# Patient Record
Sex: FEMALE | Race: WHITE | Employment: OTHER | URBAN - METROPOLITAN AREA
[De-identification: names, ages, dates, MRNs, and addresses within clinical notes are randomized per-mention and may not be internally consistent; named-entity substitution may affect disease eponyms.]

---

## 2017-05-20 PROBLEM — R79.1 SUPRATHERAPEUTIC INR: Status: ACTIVE | Noted: 2017-05-20

## 2017-05-20 PROBLEM — S30.1XXA RECTUS SHEATH HEMATOMA: Status: ACTIVE | Noted: 2017-05-20

## 2017-05-20 PROBLEM — Z79.01 CHRONIC ANTICOAGULATION: Status: ACTIVE | Noted: 2017-05-20

## 2020-07-11 ENCOUNTER — HOSPITAL ENCOUNTER (EMERGENCY)
Age: 80
Discharge: HOME | End: 2020-07-11
Attending: EMERGENCY MEDICINE
Payer: MEDICARE

## 2020-07-11 LAB
A/G RATIO: 1.1 (ref 1.1–2.2)
ALBUMIN SERPL-MCNC: 4.1 G/DL (ref 3.4–5)
ALP BLD-CCNC: 117 U/L (ref 40–129)
ALT SERPL-CCNC: 8 U/L (ref 10–40)
ANION GAP SERPL CALCULATED.3IONS-SCNC: 13 MMOL/L (ref 3–16)
APTT: 29.8 SEC (ref 24.2–36.2)
AST SERPL-CCNC: 17 U/L (ref 15–37)
BASOPHILS ABSOLUTE: 0 K/UL (ref 0–0.2)
BASOPHILS RELATIVE PERCENT: 0.8 %
BILIRUB SERPL-MCNC: 0.4 MG/DL (ref 0–1)
BUN BLDV-MCNC: 28 MG/DL (ref 7–20)
CALCIUM SERPL-MCNC: 9.5 MG/DL (ref 8.3–10.6)
CHLORIDE BLD-SCNC: 100 MMOL/L (ref 99–110)
CO2: 25 MMOL/L (ref 21–32)
CREAT SERPL-MCNC: 1 MG/DL (ref 0.6–1.2)
EOSINOPHILS ABSOLUTE: 0 K/UL (ref 0–0.6)
EOSINOPHILS RELATIVE PERCENT: 0.1 %
GFR AFRICAN AMERICAN: >60
GFR NON-AFRICAN AMERICAN: 53
GLOBULIN: 3.7 G/DL
GLUCOSE BLD-MCNC: 141 MG/DL (ref 70–99)
HCT VFR BLD CALC: 27.9 % (ref 36–48)
HEMOGLOBIN: 8.9 G/DL (ref 12–16)
INR BLD: 1.19 (ref 0.86–1.14)
LYMPHOCYTES ABSOLUTE: 0.7 K/UL (ref 1–5.1)
LYMPHOCYTES RELATIVE PERCENT: 17.9 %
MCH RBC QN AUTO: 27.5 PG (ref 26–34)
MCHC RBC AUTO-ENTMCNC: 31.9 G/DL (ref 31–36)
MCV RBC AUTO: 86.1 FL (ref 80–100)
MONOCYTES ABSOLUTE: 0.7 K/UL (ref 0–1.3)
MONOCYTES RELATIVE PERCENT: 17.7 %
NEUTROPHILS ABSOLUTE: 2.4 K/UL (ref 1.7–7.7)
NEUTROPHILS RELATIVE PERCENT: 63.5 %
PDW BLD-RTO: 20.9 % (ref 12.4–15.4)
PLATELET # BLD: 140 K/UL (ref 135–450)
PMV BLD AUTO: 7.8 FL (ref 5–10.5)
POTASSIUM SERPL-SCNC: 3.5 MMOL/L (ref 3.5–5.1)
PROTHROMBIN TIME: 13.8 SEC (ref 10–13.2)
RBC # BLD: 3.24 M/UL (ref 4–5.2)
SODIUM BLD-SCNC: 138 MMOL/L (ref 136–145)
TOTAL PROTEIN: 7.8 G/DL (ref 6.4–8.2)
WBC # BLD: 3.7 K/UL (ref 4–11)

## 2020-07-11 PROCEDURE — 85025 COMPLETE CBC W/AUTO DIFF WBC: CPT

## 2020-07-11 PROCEDURE — 85730 THROMBOPLASTIN TIME PARTIAL: CPT

## 2020-07-11 PROCEDURE — 80053 COMPREHEN METABOLIC PANEL: CPT

## 2020-07-11 PROCEDURE — 99283 EMERGENCY DEPT VISIT LOW MDM: CPT

## 2020-07-11 PROCEDURE — 85610 PROTHROMBIN TIME: CPT

## 2020-07-11 PROCEDURE — 36415 COLL VENOUS BLD VENIPUNCTURE: CPT

## 2020-07-11 NOTE — ED PROVIDER NOTES
CHIEF COMPLAINT  Epistaxis      HISTORY OF PRESENT ILLNESS  Cherise Albert is a 79 y.o. female presents to the ED with nosebleed, started a few hours ago, called EMS, bleeding had stopped by then, but BP was 95/50 per squad, improved en route after a little fluid, they noted a lot of blood at the home, had similar issue last May, no longer on blood thinners for afib. No other bleeding issues recently, no bleeding disorders, no recent cough/URI symptoms, thinks the air was just dry at home, no pain/injury, no headache, no vision changes, no numbness/weakness, no speech changes, no dysphagia, feels a little nauseated, no vomiting, thinks some was going down her throat. No other complaints, modifying factors or associated symptoms. I have reviewed the following from the nursing documentation. Past Medical History:   Diagnosis Date    [facility] ([facility]     Arthritis     Bronchitis     Heart murmur     Hypertension     Pneumonia      Past Surgical History:   Procedure Laterality Date    APPENDECTOMY      HYSTERECTOMY       No family history on file.   Social History     Socioeconomic History    Marital status:      Spouse name: Not on file    Number of children: Not on file    Years of education: Not on file    Highest education level: Not on file   Occupational History    Not on file   Social Needs    Financial resource strain: Not on file    Food insecurity     Worry: Not on file     Inability: Not on file    Transportation needs     Medical: Not on file     Non-medical: Not on file   Tobacco Use    Smoking status: Never Smoker   Substance and Sexual Activity    Alcohol use: No    Drug use: No    Sexual activity: Not on file   Lifestyle    Physical activity     Days per week: Not on file     Minutes per session: Not on file    Stress: Not on file   Relationships    Social connections     Talks on phone: Not on file     Gets together: Not on file     Attends Hinduism service: Not on file Active member of club or organization: Not on file     Attends meetings of clubs or organizations: Not on file     Relationship status: Not on file    Intimate partner violence     Fear of current or ex partner: Not on file     Emotionally abused: Not on file     Physically abused: Not on file     Forced sexual activity: Not on file   Other Topics Concern    Not on file   Social History Narrative    Not on file     No current facility-administered medications for this encounter. Current Outpatient Medications   Medication Sig Dispense Refill    traMADol (ULTRAM) 50 MG tablet Take 50 mg by mouth every 6 hours as needed for Pain      lisinopril-hydrochlorothiazide (PRINZIDE;ZESTORETIC) 20-25 MG per tablet Take 2 tablets by mouth daily       verapamil (CALAN-SR) 240 MG CR tablet Take 240 mg by mouth nightly. Allergies   Allergen Reactions    Mucinex [Guaifenesin Er] Other (See Comments)     Causes nightmares    Nsaids Other (See Comments)     On blood thinners    Prevnar 13 [Pneumococcal 13-Any Conj Vacc] Dermatitis       REVIEW OF SYSTEMS  10 systems reviewed, pertinent positives per HPI otherwise noted to be negative. PHYSICAL EXAM  There were no vitals taken for this visit. GENERAL APPEARANCE: Awake and alert. Cooperative. No acute distress  HEAD: Normocephalic. Atraumatic. EYES: PERRL. EOM's grossly intact. ENT: Mucous membranes are moist. Dried blood in left nare, no septal hematoma, no erythema/edema, no ecchymosis, no active epistaxis, airway patent, no active posterior pharyngeal bleeding, no oropharyngeal erythema/edema/exudates  NECK: Supple. No rigidity  HEART: irreg irreg, rate in 80s. No murmurs  LUNGS: Respirations nonlabored. Lungs are CTAB. ABDOMEN: Soft. Non-distended. Non-tender. No guarding or rebound. EXTREMITIES: No peripheral edema. Moves all extremities equally. All extremities neurovascularly intact. SKIN: Warm and dry. No acute rashes.    NEUROLOGICAL: Alert and oriented. No gross facial drooping. Strength 5/5, sensation intact. No truncal ataxia. Normal speech  PSYCHIATRIC: Normal mood and affect. LABS  I have reviewed all labs for this visit. Results for orders placed or performed during the hospital encounter of 07/11/20   CBC Auto Differential   Result Value Ref Range    WBC 3.7 (L) 4.0 - 11.0 K/uL    RBC 3.24 (L) 4.00 - 5.20 M/uL    Hemoglobin 8.9 (L) 12.0 - 16.0 g/dL    Hematocrit 27.9 (L) 36.0 - 48.0 %    MCV 86.1 80.0 - 100.0 fL    MCH 27.5 26.0 - 34.0 pg    MCHC 31.9 31.0 - 36.0 g/dL    RDW 20.9 (H) 12.4 - 15.4 %    Platelets 260 152 - 046 K/uL    MPV 7.8 5.0 - 10.5 fL    Neutrophils % 63.5 %    Lymphocytes % 17.9 %    Monocytes % 17.7 %    Eosinophils % 0.1 %    Basophils % 0.8 %    Neutrophils Absolute 2.4 1.7 - 7.7 K/uL    Lymphocytes Absolute 0.7 (L) 1.0 - 5.1 K/uL    Monocytes Absolute 0.7 0.0 - 1.3 K/uL    Eosinophils Absolute 0.0 0.0 - 0.6 K/uL    Basophils Absolute 0.0 0.0 - 0.2 K/uL   Comprehensive Metabolic Panel   Result Value Ref Range    Sodium 138 136 - 145 mmol/L    Potassium 3.5 3.5 - 5.1 mmol/L    Chloride 100 99 - 110 mmol/L    CO2 25 21 - 32 mmol/L    Anion Gap 13 3 - 16    Glucose 141 (H) 70 - 99 mg/dL    BUN 28 (H) 7 - 20 mg/dL    CREATININE 1.0 0.6 - 1.2 mg/dL    GFR Non- 53 (A) >60    GFR African American >60 >60    Calcium 9.5 8.3 - 10.6 mg/dL    Total Protein 7.8 6.4 - 8.2 g/dL    Alb 4.1 3.4 - 5.0 g/dL    Albumin/Globulin Ratio 1.1 1.1 - 2.2    Total Bilirubin 0.4 0.0 - 1.0 mg/dL    Alkaline Phosphatase 117 40 - 129 U/L    ALT 8 (L) 10 - 40 U/L    AST 17 15 - 37 U/L    Globulin 3.7 g/dL   Protime-INR   Result Value Ref Range    Protime 13.8 (H) 10.0 - 13.2 sec    INR 1.19 (H) 0.86 - 1.14   APTT   Result Value Ref Range    aPTT 29.8 24.2 - 36.2 sec     Downtime labs were on paper  Hemoglobin 8.9      ED COURSE/MDM  Patient seen and evaluated. Old records reviewed.  Labs and imaging reviewed and results discussed with patient. Orders Placed This Encounter   Procedures    CBC Auto Differential    Comprehensive Metabolic Panel    Protime-INR    APTT     78yo F w/ epistaxis resolved PTA, hx of afib but is not anticoagulated, Hgb 8.9 which isn't far from her prior, encouraged to f/u w/ PCP about incidental other lab findings. coags w/o significant abnormality, This patient was seen during Epic downtime in the ED, refer to paper charting for missing vitals/meds, etc. Patient appeared to be hemodynamically stable and had no further bleeding after a monitoring period in the ED, encouraged to make sure she takes her BP meds, coolmist humidifier, avoid nose picking, etc.  Strict return precautions given, all questions answered, will return if any worsening symptoms or new concerns, see AVS for further discharge information, patient verbalized understanding of plan, felt comfortable going home. CLINICAL IMPRESSION  1. Epistaxis    2. Atrial fibrillation, unspecified type ([address]    3. Essential hypertension      Vitals on downtime papers    DISPOSITION  Azalia Gomez was discharged to home in stable condition.                    Khoi Juarez DO  08/10/20 8900

## 2020-08-17 NOTE — PROGRESS NOTES
[address]   Cardiac Consultation    Referring Provider:  Regina Diallo     Chief Complaint   Patient presents with    New Patient     Cardio follow up for Atrial Fibrillation, HTN, heart murmur      Subjective: Patient is being seen today for new cardiology evaluation for AFIB, HTN, heart murmur      History of Present Illness: Today she feels good overall. Rheumatic fever as a child age 5. She had subsequently [employer] dance. She reports she had surgery in 1987 during surgery discovered she was in AFIB. Blood thinner given was started at that time. 3 years ago she had a ruptured blood vessel in abdomen in between muscles and blood thinner was stopped. . She reports nose bleeds even with baby aspirin. 1st nose bleed was April 2020 and recently while in [facility] on vacation in May 2020 had another nose bleed and was advised to follow up with cardiologist.  Denies chest pain, shortness of breath,  dizziness, palpitations and syncope. She checks her BP, weight and oxygen level every day at home and its been fine. Her daughter Jacqueline Carmona is present at exam.  She was told years ago that she needed Mitral valve repair but had no interest in pursuing this. She continues to refuse having done. States \"I don't have a good feeling about this\"    Past Medical History:   has a past medical history of A-fib ([address] Arthritis, Bronchitis, Heart murmur, Hypertension, and Pneumonia. Surgical History:   has a past surgical history that includes Hysterectomy and Appendectomy. Social History:   reports that she has never smoked. She has never used smokeless tobacco. She reports that she does not drink alcohol or use drugs. Family History:  family history is not on file. Home Medications:  Prior to Admission medications    Medication Sig Start Date End Date Taking?  Authorizing Provider   furosemide (LASIX) 20 MG tablet Take 20 mg by mouth daily  8/5/20  Yes Historical Provider, MD   losartan (COZAAR) 100 MG tablet  2/12/20  Yes Historical Provider, MD   Cholecalciferol (VITAMIN D3) 50 MCG (2000 UT) CAPS take one tablet daily 6/10/20  Yes Historical Provider, MD   potassium chloride (KLOR-CON M) 20 MEQ TBCR extended release tablet Take by mouth 7/8/20  Yes Historical Provider, MD   losartan-hydroCHLOROthiazide (HYZAAR) 100-25 MG per tablet Take 1 tablet by mouth daily   Yes Historical Provider, MD   ferrous sulfate (IRON 325) 325 (65 Fe) MG tablet Take 325 mg by mouth daily (with breakfast)   Yes Historical Provider, MD   verapamil (CALAN-SR) 240 MG CR tablet Take 240 mg by mouth nightly. Yes Historical Provider, MD   traMADol (ULTRAM) 50 MG tablet Take 50 mg by mouth every 6 hours as needed for Pain    Historical Provider, MD        Allergies:  Mucinex [guaifenesin er]; Nsaids; and Prevnar 13 [pneumococcal 13-alisa conj vacc]     Review of Systems:   12 point ROS negative in all areas as listed below except as in [place]  Constitutional, EENT, Cardiovascular, pulmonary, GI, , Musculoskeletal, skin, neurological, hematological, endocrine, Psychiatric    Physical Examination:    Vitals:    08/18/20 1538   BP: (!) 144/60   Pulse: 87   Temp:    SpO2: 98%       This SmartLink has not been configured with any valid records. Wt Readings from Last 3 Encounters:   08/18/20 143 lb (64.9 kg)   05/20/17 170 lb (77.1 kg)       Constitutional and General Appearance: NAD   Respiratory:  · Normal excursion and expansion without use of accessory muscles  · Resp Auscultation: Normal breath sounds without dullness  Cardiovascular:  · The apical impulses not displaced  · Heart tones are crisp and Irreg irreg  · Cervical veins are not engorged  · The carotid upstroke is normal in amplitude and contour without delay or bruit  · S1 variable intensity S2 normal, No S3, mitral stenosis  Murmur  · Peripheral pulses are symmetrical and full  · There is no clubbing, cyanosis of the extremities.   · No edema multiple varicose veins bilateral legs. · Femoral Arteries: 2+ and equal  · Pedal Pulses: 2+ and equal   Abdomen:  · No masses or tenderness  · Liver/Spleen: No Abnormalities Noted  Neurological/Psychiatric:  · Alert and oriented in all spheres  · Moves all extremities well  · Exhibits normal gait balance and coordination  · No abnormalities of mood, affect, memory, mentation, or behavior are noted  Skin:  · Skin: warm and dry. Imaging:  EKG 8/18/20  AFIB occasional PVC    ECHO 5/18/20 performed in [place]   EF 55-60% mild AR, MR, severe pulm HTN,     CXR  May 2020  pulmonary edema    ECHO 4//2019 performed at [facility]  EF 50-55% Severe mitral stenosis moderate to severe pulmonary HTN,  mild AS,  AR    ECHO 05/20/17  Summary   LV systolic function appears normal.   Ejection fraction is visually estimated to be 55%. No regional wall motion abnormalities are noted. Mild concentric left ventricular hypertrophy. Evidence for diastolic dysfunction. Very severe biatrial dilatation. There is decreased leaflet motion and \"hockey stick\" appearance of thickened   mitral leaflets. Severe mitral stenosis (P1/2 time=253ms with MVA=0.87cm2   and mean pressure gradient=22mmHg). Mild to moderate eccentric mitral   regurgitation. Moderate tricuspid regurgitation. Mild RV systolic dysfunction. The systolic pulmonary artery pressure (SPAP) estimated at 88 mmHg   (estimated RA pressure of 15 mmHg included) c/w severe pulmonary HTN. EKG 05/20/17  Atrial fibrillation   Baseline artifact   Rightward axis   ST & T wave abnormality, consider inferolateral ischemia or digitalis effect   Abnormal ECG   No previous ECGs available  Confirmed by SUSAN CALVO MD ([id number]) on 5/20/2017 7:48:04 AM     Assessment:     1. Permanent atrial fibrillation    2. Rheumatic mitral stenosis with insufficiency    3. Essential hypertension    4.  Heart murmur    [id number] Score for Atrial Fibrillation Stroke Risk   Risk   Factors  Component Value   C CHF No 0   H HTN Yes 1   A2 Age >= 76 Yes,  (79 y.o.) 2   D DM No 0   S2 Prior Stroke/TIA No 0   V Vascular Disease No 0   A Age 74-69 No,  (79 y.o.) 0   Sc Sex female 1    [id number]  Score  4   Score last updated 8/18/20 0:45 PM EDT    Click here for a link to the UpToDate guideline \"Atrial Fibrillation: Anticoagulation therapy to prevent embolization    Disclaimer: Risk Score calculation is dependent on accuracy of patient problem list and past encounter diagnosis. Plan:  1. Condition of Mitral stenosis with MVR reviewed in depth with patient and daughter about replacement which I recommend. They will consider this and let me know. If she decides to proceed call office and will schedule angiogram 1st  2. No med change warranted today no refills warranted  3. Follow up in 3 months  4. She although high risk for thromboembolism but she had what appears to be rectal sheath hemorrhage so will hold off for now but discuss on next visit  with her. She is also prone to recurrent nose bleed        Thank you for allowing me to participate in the care of this individual.    QUALITY MEASURES  1. Tobacco Cessation Counseling: NA  2. Retake of BP if >140/90:   Yes  3. Documentation to PCP/referring for new patient:  Sent to PCP at close of office visit  4. CAD patient on anti-platelet: NA  5. CAD patient on STATIN therapy:  NA  6. Patient with CHF and aFib on anticoagulation:  No unable to take due to bleeding     This note was scribed in the presence of  Holly Eric MD by Alicia Mancia RN  I, Dr. Holly Eric, personally performed the services described in this documentation, as scribed by the above signed scribe in my presence. It is both accurate and complete to my knowledge. I agree with the details independently gathered by the clinical support staff, while the remaining scribed note accurately describes my personal service to the patient.       Tara Burgos MD

## 2020-08-18 ENCOUNTER — OFFICE VISIT (OUTPATIENT)
Dept: CARDIOLOGY CLINIC | Age: 80
End: 2020-08-18
Payer: MEDICARE

## 2020-08-18 VITALS
HEIGHT: 63 IN | WEIGHT: 143 LBS | DIASTOLIC BLOOD PRESSURE: 60 MMHG | TEMPERATURE: 97.9 F | OXYGEN SATURATION: 98 % | BODY MASS INDEX: 25.34 KG/M2 | HEART RATE: 87 BPM | SYSTOLIC BLOOD PRESSURE: 144 MMHG

## 2020-08-18 PROCEDURE — G8417 CALC BMI ABV UP PARAM F/U: HCPCS | Performed by: INTERNAL MEDICINE

## 2020-08-18 PROCEDURE — 99204 OFFICE O/P NEW MOD 45 MIN: CPT | Performed by: INTERNAL MEDICINE

## 2020-08-18 PROCEDURE — G8427 DOCREV CUR MEDS BY ELIG CLIN: HCPCS | Performed by: INTERNAL MEDICINE

## 2020-08-18 PROCEDURE — 1090F PRES/ABSN URINE INCON ASSESS: CPT | Performed by: INTERNAL MEDICINE

## 2020-08-18 PROCEDURE — 93000 ELECTROCARDIOGRAM COMPLETE: CPT | Performed by: INTERNAL MEDICINE

## 2020-08-18 RX ORDER — FUROSEMIDE 20 MG/1
20 TABLET ORAL DAILY
COMMUNITY
Start: 2020-08-05 | End: 2020-12-18 | Stop reason: SDUPTHER

## 2020-08-18 RX ORDER — FERROUS SULFATE 325(65) MG
325 TABLET ORAL
COMMUNITY

## 2020-08-18 RX ORDER — ACETAMINOPHEN 160 MG
TABLET,DISINTEGRATING ORAL
COMMUNITY
Start: 2020-06-10

## 2020-08-18 RX ORDER — POTASSIUM CHLORIDE 1500 MG/1
TABLET, FILM COATED, EXTENDED RELEASE ORAL
COMMUNITY
Start: 2020-07-08 | End: 2020-12-18 | Stop reason: SDUPTHER

## 2020-08-18 RX ORDER — LOSARTAN POTASSIUM AND HYDROCHLOROTHIAZIDE 25; 100 MG/1; MG/1
1 TABLET ORAL DAILY
COMMUNITY
End: 2020-12-18 | Stop reason: SDUPTHER

## 2020-08-18 RX ORDER — LOSARTAN POTASSIUM 100 MG/1
TABLET ORAL DAILY
COMMUNITY
Start: 2020-02-12 | End: 2020-12-18 | Stop reason: ALTCHOICE

## 2020-08-18 NOTE — PATIENT INSTRUCTIONS
Plan:  1. Condition of Mitral stenosis with MVR reviewed in depth with patient and daughter about replacement  They will consider this and let me know. If she decides to proceed call office and will schedule angiogram 1st  2. No med change warranted today no refills warranted  3.  Follow up in 3 months

## 2020-12-18 ENCOUNTER — OFFICE VISIT (OUTPATIENT)
Dept: CARDIOLOGY CLINIC | Age: 80
End: 2020-12-18
Payer: MEDICARE

## 2020-12-18 VITALS
HEIGHT: 63 IN | BODY MASS INDEX: 27.29 KG/M2 | OXYGEN SATURATION: 90 % | SYSTOLIC BLOOD PRESSURE: 136 MMHG | WEIGHT: 154 LBS | HEART RATE: 95 BPM | DIASTOLIC BLOOD PRESSURE: 64 MMHG

## 2020-12-18 PROBLEM — R06.02 SOB (SHORTNESS OF BREATH): Status: ACTIVE | Noted: 2020-12-18

## 2020-12-18 PROBLEM — I05.0 RHEUMATIC MITRAL STENOSIS: Status: ACTIVE | Noted: 2020-12-18

## 2020-12-18 PROCEDURE — G8427 DOCREV CUR MEDS BY ELIG CLIN: HCPCS | Performed by: INTERNAL MEDICINE

## 2020-12-18 PROCEDURE — 99214 OFFICE O/P EST MOD 30 MIN: CPT | Performed by: INTERNAL MEDICINE

## 2020-12-18 PROCEDURE — 1123F ACP DISCUSS/DSCN MKR DOCD: CPT | Performed by: INTERNAL MEDICINE

## 2020-12-18 PROCEDURE — G8484 FLU IMMUNIZE NO ADMIN: HCPCS | Performed by: INTERNAL MEDICINE

## 2020-12-18 PROCEDURE — G8417 CALC BMI ABV UP PARAM F/U: HCPCS | Performed by: INTERNAL MEDICINE

## 2020-12-18 PROCEDURE — 4040F PNEUMOC VAC/ADMIN/RCVD: CPT | Performed by: INTERNAL MEDICINE

## 2020-12-18 PROCEDURE — 1090F PRES/ABSN URINE INCON ASSESS: CPT | Performed by: INTERNAL MEDICINE

## 2020-12-18 PROCEDURE — G8400 PT W/DXA NO RESULTS DOC: HCPCS | Performed by: INTERNAL MEDICINE

## 2020-12-18 PROCEDURE — 1036F TOBACCO NON-USER: CPT | Performed by: INTERNAL MEDICINE

## 2020-12-18 RX ORDER — LOSARTAN POTASSIUM AND HYDROCHLOROTHIAZIDE 25; 100 MG/1; MG/1
1 TABLET ORAL DAILY
Qty: 90 TABLET | Refills: 3 | Status: SHIPPED | OUTPATIENT
Start: 2020-12-18 | End: 2021-01-17

## 2020-12-18 RX ORDER — POTASSIUM CHLORIDE 1500 MG/1
20 TABLET, FILM COATED, EXTENDED RELEASE ORAL DAILY
Qty: 90 TABLET | Refills: 3 | Status: SHIPPED | OUTPATIENT
Start: 2020-12-18

## 2020-12-18 RX ORDER — FUROSEMIDE 40 MG/1
40 TABLET ORAL DAILY
Qty: 90 TABLET | Refills: 3 | Status: SHIPPED | OUTPATIENT
Start: 2020-12-18

## 2020-12-18 NOTE — LETTER
[facility], [place]   Cardiac follow up     Referring Provider:  Melissa Rivas     Chief Complaint   Patient presents with    Follow-up    Hypertension    Atrial Fibrillation    Shortness of Breath     with activity    Edema     ankles      Subjective:    Chelsey Gambino is a 79 y.o.  female who is here today for follow up for a history of atrial fibrillation, rheumatic mitral stenosis, hypertension and pulmonary hypertension. Today she has SOB and ankle swelling. History of Present Illness: Today she states she has been feeling well overall. She states her weight is up some and she has ankle swelling along with SOB. She is taking Losartan--25 mg daily, and lasix daily. She is tolerating her medications and taking them as prescribed. Patient currently denies any palpitations, chest pain, dizziness, and syncope. She watches salt in her diet and follows fluid restriction. Past medical History:   Chelsey Gambino is a 79 y.o. female who has a past medical history of atrial fibrillation, rheumatic mitral stenosis, hypertension, and a murmur. She was admitted to the hospital in 5/2017 with abdominal pain. A CT of the abdomin showed large rectus sheath hematoma with signs of active hemorrhage. She had acute anemia and required I unit of PRBC's. Her INR was 3.6 so she received 6 units of FFP. She was treated for fluid overload with IV Lasix. Most recent echocardiogram from 4/10/19 at [facility] showed an EF 50-55% Severe mitral stenosis moderate to severe pulmonary HTN, mild aortic stenosis and regurgitation. Past Medical History:   has a past medical history of A-fib ([address] Arthritis, Bronchitis, Heart murmur, Hypertension, and Pneumonia. Surgical History:   has a past surgical history that includes Hysterectomy and Appendectomy.      Social History: reports that she has never smoked. She has never used smokeless tobacco. She reports that she does not drink alcohol or use drugs. Family History:  family history is not on file. Father had CHF and  at 66, was an alcoholic. mother had dementia and was also an alcoholic. She dies in her 80;s. Home Medications:  Prior to Admission medications    Medication Sig Start Date End Date Taking? Authorizing Provider   furosemide (LASIX) 20 MG tablet Take 20 mg by mouth daily  20  Yes Historical Provider, MD   Cholecalciferol (VITAMIN D3) 50 MCG (2000 UT) CAPS take one tablet daily 6/10/20  Yes Historical Provider, MD   potassium chloride (KLOR-CON M) 20 MEQ TBCR extended release tablet Take by mouth 20  Yes Historical Provider, MD   losartan-hydroCHLOROthiazide (HYZAAR) 100-25 MG per tablet Take 1 tablet by mouth daily   Yes Historical Provider, MD   ferrous sulfate (IRON 325) 325 (65 Fe) MG tablet Take 325 mg by mouth daily (with breakfast)   Yes Historical Provider, MD   traMADol (ULTRAM) 50 MG tablet Take 50 mg by mouth every 6 hours as needed for Pain   Yes Historical Provider, MD   verapamil (CALAN-SR) 240 MG CR tablet Take 240 mg by mouth nightly. Yes Historical Provider, MD        Allergies:  Mucinex [guaifenesin er], Nsaids, and Prevnar 13 [pneumococcal 13-alisa conj vacc]     Review of Systems:   12 point ROS negative in all areas as listed below except as in [place]  Constitutional, EENT, pulmonary, GI, , Musculoskeletal, skin, neurological, hematological, endocrine, Psychiatric    Physical Examination:    Vitals:    20 1249   BP: 136/64   Pulse: 95   SpO2: 90%       This SmartLink has not been configured with any valid records.       Wt Readings from Last 3 Encounters:   20 154 lb (69.9 kg)   20 143 lb (64.9 kg)   17 170 lb (77.1 kg)       Constitutional and General Appearance: NAD   Respiratory:  · Normal excursion and expansion without use of accessory muscles · Resp Auscultation: Normal breath sounds without dullness  Cardiovascular:  · The apical impulses not displaced  · Heart tones are crisp and Irreg irreg  · Cervical veins are not engorged  · The carotid upstroke is normal in amplitude and contour without delay or bruit  · S1 variable intensity S2 normal, No S3, mitral stenosis and mitral regurg/ tricuspid regurg   Murmur  · Peripheral pulses are symmetrical and full  · There is no clubbing, cyanosis of the extremities. · 2+BLE edema, multiple varicose veins bilateral legs. · Femoral Arteries: 2+ and equal  · Pedal Pulses: 2+ and equal   Abdomen:  · No masses or tenderness  · Liver/Spleen: No Abnormalities Noted  Neurological/Psychiatric:  · Alert and oriented in all spheres  · Moves all extremities well  · Exhibits normal gait balance and coordination  · No abnormalities of mood, affect, memory, mentation, or behavior are noted  Skin:  · Skin: warm and dry. Imaging:  I have reviewed the following tests and documented in this encounter as follows:   Discussed with the patient and daughter who accompanied her. EKG 8/18/20  AFIB occasional PVC    ECHO 5/18/20 performed in [place]   EF 55-60% mild AR, MR, severe pulm HTN,     CXR  May 2020  pulmonary edema    ECHO 4//2019 performed at [facility]  EF 50-55% Severe mitral stenosis moderate to severe pulmonary HTN,  mild AS,  AR    ECHO 05/20/17  Summary   LV systolic function appears normal.   Ejection fraction is visually estimated to be 55%. No regional wall motion abnormalities are noted. Mild concentric left ventricular hypertrophy. Evidence for diastolic dysfunction. Very severe biatrial dilatation. There is decreased leaflet motion and \"hockey stick\" appearance of thickened   mitral leaflets. Severe mitral stenosis (P1/2 time=253ms with MVA=0.87cm2   and mean pressure gradient=22mmHg). Mild to moderate eccentric mitral   regurgitation. Moderate tricuspid regurgitation. Mild RV systolic dysfunction. The systolic pulmonary artery pressure (SPAP) estimated at 88 mmHg   (estimated RA pressure of 15 mmHg included) c/w severe pulmonary HTN. EKG 05/20/17  Atrial fibrillation   Baseline artifact   Rightward axis   ST & T wave abnormality, consider inferolateral ischemia or digitalis effect   Abnormal ECG   No previous ECGs available  Confirmed by SUSAN CALVO MD ([id number]) on 5/20/2017 7:48:04 AM     CT of the abdomin and Pelvis   1. Large rectus sheath hematoma on the left, which contains blood products of mixed chronicity, including several foci of active hemorrhage. A small amount of blood extends from the hematoma into the anterior pelvis. 2. Marked cardiomegaly with interstitial edema. 3. Uncomplicated cholelithiasis. Results of this examination were verbally communicated to Dr. Ale Bruce at 5:16 a.m. on 05/20/2017. Assessment:   1. Atrial fibrillation rate is controlled it is permanent  2. Hypertension controlled with losartan HCTZ  3. Rheumatic mitral stenosis with insufficiency   4. CHF  5. History of rectal sheath hemorrhage   6. History of nose bleeds   7. SOB due to CHF  8. Ankle swelling will increase lasix      1. Permanent atrial fibrillation ([address]    2. Essential hypertension    3. SOB (shortness of breath)    4. Ankle swelling, unspecified laterality    5. Rheumatic mitral stenosis        [id number] Score for Atrial Fibrillation Stroke Risk   Risk   Factors  Component Value   C CHF No 0   H HTN Yes 1   A2 Age >= 76 Yes,  (79 y.o.) 2   D DM No 0   S2 Prior Stroke/TIA No 0   V Vascular Disease No 0   A Age 74-69 No,  (79 y.o.) 0   Sc Sex female 1    [id number]  Score  4   Score last updated 8/18/20 8:14 PM EDT    Click here for a link to the UpToDate guideline \"Atrial Fibrillation: Anticoagulation therapy to prevent embolization    Disclaimer: Risk Score calculation is dependent on accuracy of patient problem list and past encounter diagnosis.       Plan: 1.  Repeat echocardiogram due to swelling and shortness of breath- call [phone]  2. Increase Lasix to 40 mg daily due to swelling   3. Repeat BMP in 2 weeks   4. Limit fluids under 64 ounces daily- this counts all fluids   5. Limit salt intake   6. Follow up with me in one month   She will need corrective surgery  We discussed down hill course to be expected without surgery  Discussed prophylactic vaccination for flu and COVID-19  Thank you for allowing me to participate in the care of this individual.    QUALITY MEASURES  1. Tobacco Cessation Counseling: NA  2. Retake of BP if >140/90:   Yes  3. Documentation to PCP/referring for new patient:  Sent to PCP at close of office visit  4. CAD patient on anti-platelet: NA  5. CAD patient on STATIN therapy:  NA  6. Patient with CHF and aFib on anticoagulation:  No unable to take due to bleeding     This note was scribed in the presence of Gloria Jolley MD by Mina Braden RN  I, Dr. Luther Dewey, personally performed the services described in this documentation, as scribed by the above signed scribe in my presence. It is both accurate and complete to my knowledge. I agree with the details independently gathered by the clinical support staff, while the remaining scribed note accurately describes my personal service to the patient.       Aracelis Tapia MD

## 2020-12-18 NOTE — PROGRESS NOTES
[address]   Cardiac follow up     Referring Provider:  Michael Jeter     Chief Complaint   Patient presents with    Follow-up    Hypertension    Atrial Fibrillation    Shortness of Breath     with activity    Edema     ankles      Subjective:    Ralph Mckenzie is a 79 y.o.  female who is here today for follow up for a history of atrial fibrillation, rheumatic mitral stenosis, hypertension and pulmonary hypertension. Today she has SOB and ankle swelling. History of Present Illness: Today she states she has been feeling well overall. She states her weight is up some and she has ankle swelling along with SOB. She is taking Losartan--25 mg daily, and lasix daily. She is tolerating her medications and taking them as prescribed. Patient currently denies any palpitations, chest pain, dizziness, and syncope. She watches salt in her diet and follows fluid restriction. Past medical History:   Ralph Mckenzie is a 79 y.o. female who has a past medical history of atrial fibrillation, rheumatic mitral stenosis, hypertension, and a murmur. She was admitted to the hospital in 5/2017 with abdominal pain. A CT of the abdomin showed large rectus sheath hematoma with signs of active hemorrhage. She had acute anemia and required I unit of PRBC's. Her INR was 3.6 so she received 6 units of FFP. She was treated for fluid overload with IV Lasix. Most recent echocardiogram from 4/10/19 at [facility] showed an EF 50-55% Severe mitral stenosis moderate to severe pulmonary HTN, mild aortic stenosis and regurgitation. Past Medical History:   has a past medical history of A-fib ([address] Arthritis, Bronchitis, Heart murmur, Hypertension, and Pneumonia. Surgical History:   has a past surgical history that includes Hysterectomy and Appendectomy. Social History:   reports that she has never smoked.  She has never used smokeless tobacco. She reports that she does not drink alcohol or use drugs. Family History:  family history is not on file. Father had CHF and  at 66, was an alcoholic. mother had dementia and was also an alcoholic. She dies in her 80;s. Home Medications:  Prior to Admission medications    Medication Sig Start Date End Date Taking? Authorizing Provider   furosemide (LASIX) 20 MG tablet Take 20 mg by mouth daily  20  Yes Historical Provider, MD   Cholecalciferol (VITAMIN D3) 50 MCG (2000 UT) CAPS take one tablet daily 6/10/20  Yes Historical Provider, MD   potassium chloride (KLOR-CON M) 20 MEQ TBCR extended release tablet Take by mouth 20  Yes Historical Provider, MD   losartan-hydroCHLOROthiazide (HYZAAR) 100-25 MG per tablet Take 1 tablet by mouth daily   Yes Historical Provider, MD   ferrous sulfate (IRON 325) 325 (65 Fe) MG tablet Take 325 mg by mouth daily (with breakfast)   Yes Historical Provider, MD   traMADol (ULTRAM) 50 MG tablet Take 50 mg by mouth every 6 hours as needed for Pain   Yes Historical Provider, MD   verapamil (CALAN-SR) 240 MG CR tablet Take 240 mg by mouth nightly. Yes Historical Provider, MD        Allergies:  Mucinex [guaifenesin er], Nsaids, and Prevnar 13 [pneumococcal 13-alisa conj vacc]     Review of Systems:   12 point ROS negative in all areas as listed below except as in [place]  Constitutional, EENT, pulmonary, GI, , Musculoskeletal, skin, neurological, hematological, endocrine, Psychiatric    Physical Examination:    Vitals:    20 1249   BP: 136/64   Pulse: 95   SpO2: 90%       This SmartLink has not been configured with any valid records.       Wt Readings from Last 3 Encounters:   20 154 lb (69.9 kg)   20 143 lb (64.9 kg)   17 170 lb (77.1 kg)       Constitutional and General Appearance: NAD   Respiratory:  · Normal excursion and expansion without use of accessory muscles  · Resp Auscultation: Normal breath sounds without dullness  Cardiovascular:  · The apical impulses not displaced  · Heart tones are crisp and Irreg irreg  · Cervical veins are not engorged  · The carotid upstroke is normal in amplitude and contour without delay or bruit  · S1 variable intensity S2 normal, No S3, mitral stenosis and mitral regurg/ tricuspid regurg   Murmur  · Peripheral pulses are symmetrical and full  · There is no clubbing, cyanosis of the extremities. · 2+BLE edema, multiple varicose veins bilateral legs. · Femoral Arteries: 2+ and equal  · Pedal Pulses: 2+ and equal   Abdomen:  · No masses or tenderness  · Liver/Spleen: No Abnormalities Noted  Neurological/Psychiatric:  · Alert and oriented in all spheres  · Moves all extremities well  · Exhibits normal gait balance and coordination  · No abnormalities of mood, affect, memory, mentation, or behavior are noted  Skin:  · Skin: warm and dry. Imaging:  I have reviewed the following tests and documented in this encounter as follows:   Discussed with the patient and daughter who accompanied her. EKG 8/18/20  AFIB occasional PVC    ECHO 5/18/20 performed in [place]   EF 55-60% mild AR, MR, severe pulm HTN,     CXR  May 2020  pulmonary edema    ECHO 4//2019 performed at [facility]  EF 50-55% Severe mitral stenosis moderate to severe pulmonary HTN,  mild AS,  AR    ECHO 05/20/17  Summary   LV systolic function appears normal.   Ejection fraction is visually estimated to be 55%. No regional wall motion abnormalities are noted. Mild concentric left ventricular hypertrophy. Evidence for diastolic dysfunction. Very severe biatrial dilatation. There is decreased leaflet motion and \"hockey stick\" appearance of thickened   mitral leaflets. Severe mitral stenosis (P1/2 time=253ms with MVA=0.87cm2   and mean pressure gradient=22mmHg). Mild to moderate eccentric mitral   regurgitation. Moderate tricuspid regurgitation. Mild RV systolic dysfunction.    The systolic pulmonary artery pressure (SPAP) estimated at 88 mmHg   (estimated RA pressure of 15 mmHg included) c/w severe pulmonary HTN. EKG 05/20/17  Atrial fibrillation   Baseline artifact   Rightward axis   ST & T wave abnormality, consider inferolateral ischemia or digitalis effect   Abnormal ECG   No previous ECGs available  Confirmed by SUSAN CALVO MD ([id number]) on 5/20/2017 7:48:04 AM     CT of the abdomin and Pelvis   1. Large rectus sheath hematoma on the left, which contains blood products of mixed chronicity, including several foci of active hemorrhage. A small amount of blood extends from the hematoma into the anterior pelvis. 2. Marked cardiomegaly with interstitial edema. 3. Uncomplicated cholelithiasis. Results of this examination were verbally communicated to Dr. Jayna Huggins at 5:16 a.m. on 05/20/2017. Assessment:   1. Atrial fibrillation rate is controlled it is permanent  2. Hypertension controlled with losartan HCTZ  3. Rheumatic mitral stenosis with insufficiency   4. CHF  5. History of rectal sheath hemorrhage   6. History of nose bleeds   7. SOB due to CHF  8. Ankle swelling will increase lasix      1. Permanent atrial fibrillation ([address]    2. Essential hypertension    3. SOB (shortness of breath)    4. Ankle swelling, unspecified laterality    5. Rheumatic mitral stenosis        [id number] Score for Atrial Fibrillation Stroke Risk   Risk   Factors  Component Value   C CHF No 0   H HTN Yes 1   A2 Age >= 76 Yes,  (79 y.o.) 2   D DM No 0   S2 Prior Stroke/TIA No 0   V Vascular Disease No 0   A Age 74-69 No,  (79 y.o.) 0   Sc Sex female 1    [id number]  Score  4   Score last updated 8/18/20 1:62 PM EDT    Click here for a link to the UpToDate guideline \"Atrial Fibrillation: Anticoagulation therapy to prevent embolization    Disclaimer: Risk Score calculation is dependent on accuracy of patient problem list and past encounter diagnosis. Plan:  1. Repeat echocardiogram due to swelling and shortness of breath- call [phone]  2.   Increase Lasix to 40 mg daily due to swelling 3.  Repeat BMP in 2 weeks   4. Limit fluids under 64 ounces daily- this counts all fluids   5. Limit salt intake   6. Follow up with me in one month   She will need corrective surgery  We discussed down hill course to be expected without surgery  Discussed prophylactic vaccination for flu and COVID-19  Thank you for allowing me to participate in the care of this individual.    QUALITY MEASURES  1. Tobacco Cessation Counseling: NA  2. Retake of BP if >140/90:   Yes  3. Documentation to PCP/referring for new patient:  Sent to PCP at close of office visit  4. CAD patient on anti-platelet: NA  5. CAD patient on STATIN therapy:  NA  6. Patient with CHF and aFib on anticoagulation:  No unable to take due to bleeding     This note was scribed in the presence of Guru Harley MD by Tammy Morrell RN  I, Dr. Elyse Scott, personally performed the services described in this documentation, as scribed by the above signed scribe in my presence. It is both accurate and complete to my knowledge. I agree with the details independently gathered by the clinical support staff, while the remaining scribed note accurately describes my personal service to the patient.       Bonilla Rubin MD

## 2020-12-18 NOTE — PATIENT INSTRUCTIONS
Plan:  1. Repeat echocardiogram due to swelling and shortness of breath- call [phone]  2. Increase Lasix to 40 mg daily due to swelling   3. Repeat BMP in 2 weeks   4. Limit fluids under 64 ounces daily- this counts all fluids   5. Limit salt intake   6.   Follow up with me in one month

## 2020-12-22 ENCOUNTER — HOSPITAL ENCOUNTER (OUTPATIENT)
Dept: NON INVASIVE DIAGNOSTICS | Age: 80
Discharge: HOME | End: 2020-12-22
Payer: MEDICARE

## 2020-12-22 LAB
LV EF: 58 %
LVEF MODALITY: NORMAL

## 2020-12-22 PROCEDURE — 93306 TTE W/DOPPLER COMPLETE: CPT

## 2021-01-02 ENCOUNTER — HOSPITAL ENCOUNTER (OUTPATIENT)
Age: 81
Discharge: HOME | End: 2021-01-02
Payer: MEDICARE

## 2021-01-02 DIAGNOSIS — R06.02 SOB (SHORTNESS OF BREATH): ICD-10-CM

## 2021-01-02 LAB
ANION GAP SERPL CALCULATED.3IONS-SCNC: 15 MMOL/L (ref 3–16)
BUN BLDV-MCNC: 41 MG/DL (ref 7–20)
CALCIUM SERPL-MCNC: 10.4 MG/DL (ref 8.3–10.6)
CHLORIDE BLD-SCNC: 99 MMOL/L (ref 99–110)
CO2: 27 MMOL/L (ref 21–32)
CREAT SERPL-MCNC: 1.5 MG/DL (ref 0.6–1.2)
GFR AFRICAN AMERICAN: 40
GFR NON-AFRICAN AMERICAN: 33
GLUCOSE BLD-MCNC: 119 MG/DL (ref 70–99)
POTASSIUM SERPL-SCNC: 3.7 MMOL/L (ref 3.5–5.1)
SODIUM BLD-SCNC: 141 MMOL/L (ref 136–145)

## 2021-01-02 PROCEDURE — 36415 COLL VENOUS BLD VENIPUNCTURE: CPT

## 2021-01-02 PROCEDURE — 80048 BASIC METABOLIC PNL TOTAL CA: CPT

## 2021-01-17 ENCOUNTER — HOSPITAL ENCOUNTER (EMERGENCY)
Age: 81
Discharge: HOME | End: 2021-01-17
Attending: EMERGENCY MEDICINE
Payer: MEDICARE

## 2021-01-17 VITALS
SYSTOLIC BLOOD PRESSURE: 129 MMHG | RESPIRATION RATE: 20 BRPM | HEART RATE: 93 BPM | DIASTOLIC BLOOD PRESSURE: 83 MMHG | TEMPERATURE: 98 F | OXYGEN SATURATION: 95 % | WEIGHT: 138 LBS | HEIGHT: 63 IN | BODY MASS INDEX: 24.45 KG/M2

## 2021-01-17 DIAGNOSIS — I48.11 LONGSTANDING PERSISTENT ATRIAL FIBRILLATION (HCC): ICD-10-CM

## 2021-01-17 DIAGNOSIS — R04.0 EPISTAXIS: Primary | ICD-10-CM

## 2021-01-17 LAB
ANION GAP SERPL CALCULATED.3IONS-SCNC: 15 MMOL/L (ref 3–16)
APTT: 34.4 SEC (ref 24.2–36.2)
BASOPHILS ABSOLUTE: 0.2 K/UL (ref 0–0.2)
BASOPHILS RELATIVE PERCENT: 7 %
BUN BLDV-MCNC: 45 MG/DL (ref 7–20)
CALCIUM SERPL-MCNC: 10.7 MG/DL (ref 8.3–10.6)
CHLORIDE BLD-SCNC: 99 MMOL/L (ref 99–110)
CO2: 26 MMOL/L (ref 21–32)
CREAT SERPL-MCNC: 1.4 MG/DL (ref 0.6–1.2)
EOSINOPHILS ABSOLUTE: 0 K/UL (ref 0–0.6)
EOSINOPHILS RELATIVE PERCENT: 0 %
GFR AFRICAN AMERICAN: 44
GFR NON-AFRICAN AMERICAN: 36
GLUCOSE BLD-MCNC: 147 MG/DL (ref 70–99)
HCT VFR BLD CALC: 30.2 % (ref 36–48)
HEMOGLOBIN: 10 G/DL (ref 12–16)
INR BLD: 1.28 (ref 0.86–1.14)
LYMPHOCYTES ABSOLUTE: 0.5 K/UL (ref 1–5.1)
LYMPHOCYTES RELATIVE PERCENT: 14.5 %
MCH RBC QN AUTO: 30.5 PG (ref 26–34)
MCHC RBC AUTO-ENTMCNC: 33.2 G/DL (ref 31–36)
MCV RBC AUTO: 91.8 FL (ref 80–100)
MONOCYTES ABSOLUTE: 0.4 K/UL (ref 0–1.3)
MONOCYTES RELATIVE PERCENT: 12.7 %
NEUTROPHILS ABSOLUTE: 2.2 K/UL (ref 1.7–7.7)
NEUTROPHILS RELATIVE PERCENT: 65.8 %
PDW BLD-RTO: 16.4 % (ref 12.4–15.4)
PLATELET # BLD: 158 K/UL (ref 135–450)
PMV BLD AUTO: 7.7 FL (ref 5–10.5)
POTASSIUM REFLEX MAGNESIUM: 4 MMOL/L (ref 3.5–5.1)
PROTHROMBIN TIME: 14.7 SEC (ref 10–13.2)
RBC # BLD: 3.29 M/UL (ref 4–5.2)
SODIUM BLD-SCNC: 140 MMOL/L (ref 136–145)
WBC # BLD: 3.3 K/UL (ref 4–11)

## 2021-01-17 PROCEDURE — 85730 THROMBOPLASTIN TIME PARTIAL: CPT

## 2021-01-17 PROCEDURE — 6360000002 HC RX W HCPCS

## 2021-01-17 PROCEDURE — 99285 EMERGENCY DEPT VISIT HI MDM: CPT

## 2021-01-17 PROCEDURE — 6370000000 HC RX 637 (ALT 250 FOR IP): Performed by: EMERGENCY MEDICINE

## 2021-01-17 PROCEDURE — 36415 COLL VENOUS BLD VENIPUNCTURE: CPT

## 2021-01-17 PROCEDURE — 96375 TX/PRO/DX INJ NEW DRUG ADDON: CPT

## 2021-01-17 PROCEDURE — 85025 COMPLETE CBC W/AUTO DIFF WBC: CPT

## 2021-01-17 PROCEDURE — 80048 BASIC METABOLIC PNL TOTAL CA: CPT

## 2021-01-17 PROCEDURE — 2580000003 HC RX 258: Performed by: EMERGENCY MEDICINE

## 2021-01-17 PROCEDURE — 30903 CONTROL OF NOSEBLEED: CPT

## 2021-01-17 PROCEDURE — 96374 THER/PROPH/DIAG INJ IV PUSH: CPT

## 2021-01-17 PROCEDURE — 85610 PROTHROMBIN TIME: CPT

## 2021-01-17 PROCEDURE — 6360000002 HC RX W HCPCS: Performed by: EMERGENCY MEDICINE

## 2021-01-17 RX ORDER — 0.9 % SODIUM CHLORIDE 0.9 %
500 INTRAVENOUS SOLUTION INTRAVENOUS ONCE
Status: COMPLETED | OUTPATIENT
Start: 2021-01-17 | End: 2021-01-17

## 2021-01-17 RX ORDER — HYDROCODONE BITARTRATE AND ACETAMINOPHEN 5; 325 MG/1; MG/1
1 TABLET ORAL EVERY 8 HOURS PRN
Qty: 15 TABLET | Refills: 0 | Status: SHIPPED | OUTPATIENT
Start: 2021-01-17 | End: 2021-01-22

## 2021-01-17 RX ORDER — AMOXICILLIN 500 MG/1
500 CAPSULE ORAL 2 TIMES DAILY
Qty: 20 CAPSULE | Refills: 0 | Status: SHIPPED | OUTPATIENT
Start: 2021-01-17 | End: 2021-01-27

## 2021-01-17 RX ORDER — AMOXICILLIN 500 MG/1
500 CAPSULE ORAL ONCE
Status: COMPLETED | OUTPATIENT
Start: 2021-01-17 | End: 2021-01-17

## 2021-01-17 RX ORDER — ONDANSETRON 2 MG/ML
INJECTION INTRAMUSCULAR; INTRAVENOUS
Status: COMPLETED
Start: 2021-01-17 | End: 2021-01-17

## 2021-01-17 RX ORDER — LOSARTAN POTASSIUM AND HYDROCHLOROTHIAZIDE 25; 100 MG/1; MG/1
1 TABLET ORAL DAILY
COMMUNITY

## 2021-01-17 RX ORDER — OXYMETAZOLINE HYDROCHLORIDE 0.05 G/100ML
SPRAY NASAL
Status: DISCONTINUED
Start: 2021-01-17 | End: 2021-01-17 | Stop reason: HOSPADM

## 2021-01-17 RX ORDER — MORPHINE SULFATE 4 MG/ML
4 INJECTION, SOLUTION INTRAMUSCULAR; INTRAVENOUS EVERY 30 MIN PRN
Status: DISCONTINUED | OUTPATIENT
Start: 2021-01-17 | End: 2021-01-17 | Stop reason: HOSPADM

## 2021-01-17 RX ORDER — ONDANSETRON 2 MG/ML
4 INJECTION INTRAMUSCULAR; INTRAVENOUS ONCE
Status: COMPLETED | OUTPATIENT
Start: 2021-01-17 | End: 2021-01-17

## 2021-01-17 RX ADMIN — SODIUM CHLORIDE 500 ML: 9 INJECTION, SOLUTION INTRAVENOUS at 16:02

## 2021-01-17 RX ADMIN — MORPHINE SULFATE 4 MG: 4 INJECTION INTRAVENOUS at 16:14

## 2021-01-17 RX ADMIN — ONDANSETRON 4 MG: 2 INJECTION INTRAMUSCULAR; INTRAVENOUS at 16:02

## 2021-01-17 RX ADMIN — ONDANSETRON HYDROCHLORIDE 4 MG: 2 INJECTION, SOLUTION INTRAMUSCULAR; INTRAVENOUS at 16:02

## 2021-01-17 RX ADMIN — AMOXICILLIN 500 MG: 500 CAPSULE ORAL at 17:58

## 2021-01-17 ASSESSMENT — ENCOUNTER SYMPTOMS
CHOKING: 0
NAUSEA: 0
TROUBLE SWALLOWING: 0
SORE THROAT: 0
VOMITING: 0
SHORTNESS OF BREATH: 0
COUGH: 0

## 2021-01-17 ASSESSMENT — PAIN SCALES - GENERAL: PAINLEVEL_OUTOF10: 3

## 2021-01-17 NOTE — ED PROVIDER NOTES
[address] [facility] Name: Ariela Stone  MRN: [id number]  [place] 1940  Date of evaluation: 1/17/2021  Provider: Eleonora Lange MD  PCP: Reed Pérez  ED Attending: Eleonora Lange MD    CHIEF COMPLAINT       Chief Complaint   Patient presents with    Epistaxis     nosebleed started last night, stopped over night and then started again this afternoon, denies use of blood thinners       HISTORY OF PRESENT ILLNESS   (Location/Symptom, Timing/Onset, Context/Setting, Quality, Duration, Modifying Factors, Severity)  Note limiting factors. Ariela Stone is a 79 y.o. female who presents to the emergency department with intermittent nosebleed since last night. Patient states that she began bleeding last night however was able to get it controlled. She has previously had problems with epistaxis and was diagnosed with nasal polyps. No further treatment was ever offered to her however. Patient this morning woke up and began having brisk bleeding from the left nostril. She has been able to get it stopped briefly however ultimately called 911 when it would not stop any longer. She denies any pain. She does state that she has had some trickling back into her throat. History is obtained from the patient. REVIEW OF SYSTEMS    (2-9 systems for level 4, 10 or more for level 5)     Review of Systems   Constitutional: Negative for diaphoresis and fever. HENT: Positive for congestion and nosebleeds. Negative for hearing loss, sore throat and trouble swallowing. Respiratory: Negative for cough, choking and shortness of breath. Gastrointestinal: Negative for nausea and vomiting. Musculoskeletal: Negative for neck pain. Neurological: Negative for light-headedness and headaches. Hematological: Does not bruise/bleed easily. Positives and Pertinent negatives as per HPI.   Except as noted above in the ROS, all other systems were reviewed and negative. PAST MEDICAL HISTORY     Past Medical History:   Diagnosis Date    A-fib ([address]     Arthritis     Bronchitis     Heart murmur     Hypertension     Pneumonia          SURGICAL HISTORY     Past Surgical History:   Procedure Laterality Date    APPENDECTOMY      HYSTERECTOMY           CURRENTMEDICATIONS       Discharge Medication List as of 1/17/2021  5:52 PM      CONTINUE these medications which have NOT CHANGED    Details   losartan-hydroCHLOROthiazide (HYZAAR) 100-25 MG per tablet Take 1 tablet by mouth dailyHistorical Med      furosemide (LASIX) 40 MG tablet Take 1 tablet by mouth daily, Disp-90 tablet, R-3Normal      potassium chloride (KLOR-CON M) 20 MEQ TBCR extended release tablet Take 1 tablet by mouth daily, Disp-90 tablet, R-3Normal      Cholecalciferol (VITAMIN D3) 50 MCG (2000 UT) CAPS take one tablet dailyHistorical Med      ferrous sulfate (IRON 325) 325 (65 Fe) MG tablet Take 325 mg by mouth daily (with breakfast)Historical Med      verapamil (CALAN-SR) 240 MG CR tablet Take 240 mg by mouth nightly. Historical Med               ALLERGIES     Mucinex [guaifenesin er], Nsaids, and Prevnar 13 [pneumococcal 13-alisa conj vacc]    FAMILYHISTORY     History reviewed. No pertinent family history.        SOCIAL HISTORY       Social History     Socioeconomic History    Marital status:      Spouse name: None    Number of children: None    Years of education: None    Highest education level: None   Occupational History    None   Social Needs    Financial resource strain: None    Food insecurity     Worry: None     Inability: None    Transportation needs     Medical: None     Non-medical: None   Tobacco Use    Smoking status: Never Smoker    Smokeless tobacco: Never Used   Substance and Sexual Activity    Alcohol use: No    Drug use: No    Sexual activity: None   Lifestyle    Physical activity     Days per week: None     Minutes per session: None    Stress: Postnasal bloody drip. Eyes:      Conjunctiva/sclera: Conjunctivae normal.   Cardiovascular:      Rate and Rhythm: Tachycardia present. Rhythm irregular. Pulmonary:      Effort: Pulmonary effort is normal. No respiratory distress. Breath sounds: Normal breath sounds. Abdominal:      General: There is no distension. Palpations: Abdomen is soft. Tenderness: There is no abdominal tenderness. Skin:     General: Skin is warm and dry. Capillary Refill: Capillary refill takes less than 2 seconds. Findings: No rash. Neurological:      Mental Status: She is alert and oriented to person, place, and time. Cranial Nerves: No cranial nerve deficit.          DIAGNOSTIC RESULTS   LABS:    Results for orders placed or performed during the hospital encounter of 01/17/21   CBC Auto Differential   Result Value Ref Range    WBC 3.3 (L) 4.0 - 11.0 K/uL    RBC 3.29 (L) 4.00 - 5.20 M/uL    Hemoglobin 10.0 (L) 12.0 - 16.0 g/dL    Hematocrit 30.2 (L) 36.0 - 48.0 %    MCV 91.8 80.0 - 100.0 fL    MCH 30.5 26.0 - 34.0 pg    MCHC 33.2 31.0 - 36.0 g/dL    RDW 16.4 (H) 12.4 - 15.4 %    Platelets 881 392 - 866 K/uL    MPV 7.7 5.0 - 10.5 fL    Neutrophils % 65.8 %    Lymphocytes % 14.5 %    Monocytes % 12.7 %    Eosinophils % 0.0 %    Basophils % 7.0 %    Neutrophils Absolute 2.2 1.7 - 7.7 K/uL    Lymphocytes Absolute 0.5 (L) 1.0 - 5.1 K/uL    Monocytes Absolute 0.4 0.0 - 1.3 K/uL    Eosinophils Absolute 0.0 0.0 - 0.6 K/uL    Basophils Absolute 0.2 0.0 - 0.2 K/uL   Basic Metabolic Panel w/ Reflex to MG   Result Value Ref Range    Sodium 140 136 - 145 mmol/L    Potassium reflex Magnesium 4.0 3.5 - 5.1 mmol/L    Chloride 99 99 - 110 mmol/L    CO2 26 21 - 32 mmol/L    Anion Gap 15 3 - 16    Glucose 147 (H) 70 - 99 mg/dL    BUN 45 (H) 7 - 20 mg/dL    CREATININE 1.4 (H) 0.6 - 1.2 mg/dL    GFR Non- 36 (A) >60    GFR  44 (A) >60    Calcium 10.7 (H) 8.3 - 10.6 mg/dL   Protime-INR the left nostril. 5.5 cm rapid Rhino was then placed with insufflation of 6 mL of air. Patient is now in atrial fibrillation with a rapid ventricular response. We are giving her IV fluids and will continue to monitor her. After insufflation of the rapid Rhino the patient's bleeding has stopped. [TC]      ED Course User Index  [TC] Perez Medina MD     Patient was monitored for several hours after rapid rhino placement. No further bleeding. She feels better. I am starting her on antibiotics, and have provided a limited prescription of pain medication. I want her to follow up with her ENT or PCP for packing removal in the next 3-5 days. Patient instructed to return to the ED if she begins bleeding again, or is unable to get the packing removed by those physicians. Patient is agreeable with the plan for discharge. Differential diagnosis included but was not limited to: viral URI, nasal congestion, bacterial sinusitis, viral/strep pharyngitis, otitis media, otitis externa, RPA, PTA, posterior epistaxis, fracture, anterior epistaxis. The patient understands the importance of follow up and reasons to return. FINAL IMPRESSION      1. Epistaxis    2. Longstanding persistent atrial fibrillation [facility])          DISPOSITION/PLAN   DISPOSITION Decision To Discharge 01/17/2021 05:38:50 PM      PATIENT REFERRED TO:  Yakov Dyer  [address]   [phone]    Schedule an appointment as soon as possible for a visit in 3 days      [place] Emergency Department  [address]  Go to   If symptoms worsen    Ke Mitchell  [address] [place] [address]    Schedule an appointment as soon as possible for a visit in 3 days      [place]  [facility] Emergency Department  [address]  Go in 3 days  for nasal packing removal if unable to get in with PCP or ENT.      DISCHARGE MEDICATIONS:  Discharge Medication List as of 1/17/2021  5:52 PM      START taking these medications    Details   amoxicillin (AMOXIL) 500 MG capsule Take 1 capsule by mouth 2 times daily for 10 days, Disp-20 capsule, R-0Normal      HYDROcodone-acetaminophen (NORCO) 5-325 MG per tablet Take 1 tablet by mouth every 8 hours as needed for Pain for up to 5 days.  GERI: [id number], Disp-15 tablet, R-0Normal             DISCONTINUED MEDICATIONS:  Discharge Medication List as of 1/17/2021  5:52 PM      STOP taking these medications       traMADol (ULTRAM) 50 MG tablet Comments:   Reason for Stopping:                      (Please note that portions of this note were completed with a voice recognition program.  Efforts were made to edit the dictations but occasionally words are mis-transcribed.)    Xander Higginbotham MD(electronically signed)              Xander Higginbotham MD  01/20/21 3515

## 2021-01-17 NOTE — ED NOTES
Bed: 04  Expected date:   Expected time:   Means of arrival:   Comments:  Koko Boyce RN  01/17/21 3003

## 2021-01-20 ENCOUNTER — HOSPITAL ENCOUNTER (EMERGENCY)
Age: 81
Discharge: HOME | End: 2021-01-20
Attending: STUDENT IN AN ORGANIZED HEALTH CARE EDUCATION/TRAINING PROGRAM
Payer: MEDICARE

## 2021-01-20 VITALS
TEMPERATURE: 98.3 F | HEIGHT: 63 IN | HEART RATE: 68 BPM | OXYGEN SATURATION: 97 % | RESPIRATION RATE: 18 BRPM | DIASTOLIC BLOOD PRESSURE: 74 MMHG | WEIGHT: 130 LBS | SYSTOLIC BLOOD PRESSURE: 128 MMHG | BODY MASS INDEX: 23.04 KG/M2

## 2021-01-20 DIAGNOSIS — Z48.00 ENCOUNTER FOR REMOVAL OF NASAL PACKING: Primary | ICD-10-CM

## 2021-01-20 PROCEDURE — 99284 EMERGENCY DEPT VISIT MOD MDM: CPT

## 2021-01-20 NOTE — ED NOTES
Pt noted with no active bleeding or drng to L nare after removal of Rhino Rocket.  Pt remains alert and without c/o's or s/s distress or discomfort     Jayna Juarez RN  01/20/21 1655

## 2021-01-20 NOTE — ED NOTES
Pt denies drng or bleeding from L nare.  Pt A & O x 3 and without c/o's     Junior Rubio RN  01/20/21 8781

## 2021-01-20 NOTE — ED PROVIDER NOTES
[facility]      CHIEF COMPLAINT  Other (Nasal Packing Removal)       HISTORY OF PRESENT ILLNESS  Marga Wall is a 79 y.o. female with a past medical history of atrial fibrillation, hypertension, and epistaxis, who presents to the ED for nasal packing removal.    Patient was seen on 1/17 for intermittent epistaxis for 1 day. Patient has a history of nasal polyps. Patient had a rapid Rhino placed at that time. She was counseled to have packing removed in 3-5 days by ENT or her primary care doctor. Follows up today for removal of the Rhino. She states she has not had any further bleeding since that time. She denies any headaches, fevers, chest pain, redness of her skin, or other symptoms. She denies being on blood thinners. No other complaints, modifying factors or associated symptoms. I have reviewed the following from the nursing documentation. Past Medical History:   Diagnosis Date    [address] [address]     Arthritis     Bronchitis     Heart murmur     Hypertension     Pneumonia      Past Surgical History:   Procedure Laterality Date    APPENDECTOMY      HYSTERECTOMY       History reviewed. No pertinent family history.   Social History     Socioeconomic History    Marital status:      Spouse name: Not on file    Number of children: Not on file    Years of education: Not on file    Highest education level: Not on file   Occupational History    Not on file   Social Needs    Financial resource strain: Not on file    Food insecurity     Worry: Not on file     Inability: Not on file    Transportation needs     Medical: Not on file     Non-medical: Not on file   Tobacco Use    Smoking status: Never Smoker    Smokeless tobacco: Never Used   Substance and Sexual Activity    Alcohol use: No    Drug use: No    Sexual activity: Not on file   Lifestyle    Physical activity     Days per week: Not on file     Minutes per session: Not on file    Stress: Not on file Relationships    Social connections     Talks on phone: Not on file     Gets together: Not on file     Attends [employer] service: Not on file     Active member of club or organization: Not on file     Attends meetings of clubs or organizations: Not on file     Relationship status: Not on file    Intimate partner violence     Fear of current or ex partner: Not on file     Emotionally abused: Not on file     Physically abused: Not on file     Forced sexual activity: Not on file   Other Topics Concern    Not on file   Social History Narrative    Not on file     No current facility-administered medications for this encounter. Current Outpatient Medications   Medication Sig Dispense Refill    losartan-hydroCHLOROthiazide (HYZAAR) 100-25 MG per tablet Take 1 tablet by mouth daily      amoxicillin (AMOXIL) 500 MG capsule Take 1 capsule by mouth 2 times daily for 10 days 20 capsule 0    HYDROcodone-acetaminophen (NORCO) 5-325 MG per tablet Take 1 tablet by mouth every 8 hours as needed for Pain for up to 5 days. GERI: [id number] 15 tablet 0    furosemide (LASIX) 40 MG tablet Take 1 tablet by mouth daily 90 tablet 3    potassium chloride (KLOR-CON M) 20 MEQ TBCR extended release tablet Take 1 tablet by mouth daily 90 tablet 3    Cholecalciferol (VITAMIN D3) 50 MCG (2000 UT) CAPS take one tablet daily      ferrous sulfate (IRON 325) 325 (65 Fe) MG tablet Take 325 mg by mouth daily (with breakfast)      verapamil (CALAN-SR) 240 MG CR tablet Take 240 mg by mouth nightly. Allergies   Allergen Reactions    Mucinex [Guaifenesin Er] Other (See Comments)     Causes nightmares    Nsaids Other (See Comments)     On blood thinners    Prevnar 13 [Pneumococcal 13-Any Conj Vacc] Dermatitis       REVIEW OF SYSTEMS  10 systems reviewed, pertinent positives per HPI otherwise noted to be negative.     PHYSICAL EXAM  /61   Pulse 100   Temp 98.1 °F (36.7 °C) (Oral)   Resp 18   Ht 5' 3\" (1.6 m)   Wt 130 lb (59 kg)   SpO2 97%   BMI 23.03 kg/m²    GENERAL APPEARANCE: Awake and alert. Cooperative. no distress. HENT: Normocephalic. Atraumatic. Mucous membranes are paced. No blood in the oropharynx. Rapid Rhino is in place in the left nares with dried blood  NECK: Supple. Full range of motion of the neck without stiffness or pain. EYES: PERRL. EOM's grossly intact. HEART/CHEST: irRegular rhythm. No murmurs. Chest wall is not tender to palpation. LUNGS: Respirations unlabored. CTAB. Good air exchange. Speaking comfortably in full sentences. ABDOMEN: No tenderness. Soft. Non-distended. No masses. No organomegaly. No guarding or rebound. MUSCULOSKELETAL: No extremity edema. Compartments soft. No deformity. No tenderness in the extremities. All extremities neurovascularly intact. SKIN: Warm and dry. No acute rashes. NEUROLOGICAL: Alert and oriented. No gross facial drooping. Strength 5/5, sensation intact. PSYCHIATRIC: Normal mood and affect. ED COURSE  Patient seen and evaluated. Old records reviewed. Labs and imaging reviewed and results discussed with patient. Overall well appearing patient, in no acute distress, presenting for removal of rapid Rhino after previous visit for epistaxis 3 days ago. Physical exam remarkable for blood around the left nares and rapid Rhino in place. Differential diagnosis includes but is not limited to: Epistaxis, packing removal, patient denies any fever or other systemic infectious symptoms    Rapid Rhino was removed. No evidence of active bleeding after removal of packing. Observed for 45 minutes without further bleeding. Repeat evaluation of the nose, I do not appreciate any evidence of bleeding at this time. Low suspicion for infection at this time, packing has only been present for 3 days. Patient was placed on antibiotics. She denies any infectious symptoms.   Patient encouraged to continue to take antibiotics that were previously prescribed and

## 2021-10-26 ENCOUNTER — OFFICE VISIT (OUTPATIENT)
Dept: CARDIOLOGY CLINIC | Age: 81
End: 2021-10-26
Payer: MEDICARE

## 2021-10-26 VITALS
HEIGHT: 63 IN | BODY MASS INDEX: 24.8 KG/M2 | WEIGHT: 140 LBS | SYSTOLIC BLOOD PRESSURE: 122 MMHG | OXYGEN SATURATION: 99 % | HEART RATE: 100 BPM | DIASTOLIC BLOOD PRESSURE: 48 MMHG

## 2021-10-26 DIAGNOSIS — D61.818 PANCYTOPENIA (HCC): ICD-10-CM

## 2021-10-26 DIAGNOSIS — I10 PRIMARY HYPERTENSION: ICD-10-CM

## 2021-10-26 DIAGNOSIS — D69.6 ANEMIA WITH LOW PLATELET COUNT (HCC): ICD-10-CM

## 2021-10-26 DIAGNOSIS — I48.21 PERMANENT ATRIAL FIBRILLATION (HCC): Primary | ICD-10-CM

## 2021-10-26 DIAGNOSIS — R00.0 TACHYCARDIA: ICD-10-CM

## 2021-10-26 DIAGNOSIS — I05.0 RHEUMATIC MITRAL STENOSIS: ICD-10-CM

## 2021-10-26 DIAGNOSIS — D69.6 THROMBOCYTOPENIA, UNSPECIFIED (HCC): ICD-10-CM

## 2021-10-26 PROCEDURE — 4040F PNEUMOC VAC/ADMIN/RCVD: CPT | Performed by: INTERNAL MEDICINE

## 2021-10-26 PROCEDURE — G8484 FLU IMMUNIZE NO ADMIN: HCPCS | Performed by: INTERNAL MEDICINE

## 2021-10-26 PROCEDURE — 1036F TOBACCO NON-USER: CPT | Performed by: INTERNAL MEDICINE

## 2021-10-26 PROCEDURE — G8400 PT W/DXA NO RESULTS DOC: HCPCS | Performed by: INTERNAL MEDICINE

## 2021-10-26 PROCEDURE — G8420 CALC BMI NORM PARAMETERS: HCPCS | Performed by: INTERNAL MEDICINE

## 2021-10-26 PROCEDURE — G8427 DOCREV CUR MEDS BY ELIG CLIN: HCPCS | Performed by: INTERNAL MEDICINE

## 2021-10-26 PROCEDURE — 99214 OFFICE O/P EST MOD 30 MIN: CPT | Performed by: INTERNAL MEDICINE

## 2021-10-26 PROCEDURE — 1123F ACP DISCUSS/DSCN MKR DOCD: CPT | Performed by: INTERNAL MEDICINE

## 2021-10-26 PROCEDURE — 1090F PRES/ABSN URINE INCON ASSESS: CPT | Performed by: INTERNAL MEDICINE

## 2021-10-26 NOTE — PROGRESS NOTES
[facility]   Cardiac follow up     Referring Provider:  Natalia Mcfadden     Chief Complaint   Patient presents with    1 Year Follow Up    Atrial Fibrillation    Fatigue    Edema     bilateral feet, legs    Shortness of Breath    Hypertension   Severe  Mitral stenosis   Severe tricuspid regurgitation  Permanent afib  Subjective:    Gladys Hawkins is a 79 y.o.  female who is here today for follow up for a history of atrial fibrillation, rheumatic mitral stenosis, hypertension and pulmonary hypertension. History of Present Illness: Patient was instructed to increase lasix to 40mg daily at her last visit   12/2020. Echo 12/2020 demonstrated 55-60%. Elevated left ventricular diastolic filling pressure. The left atrium is severely dilated. The right atrium is severely dilated. Moderate posterior mitral annular calcification is present. There is decreased leaflet motion and \"hockey stick\" appearance of the anterior mitral leaflet c/w mitral stenosis. Mitral valve area is calculated at 0.6cm2 consistent with severe mitral stenosis. Mild mitral regurgitation. Aortic valve sclerosis without aortic stenosis. Mild aortic regurgitation. Severe tricuspid regurgitation. Systolic pulmonary artery pressure (SPAP) estimated at 82 mmHg (RA pressure 15 mmHg), consistent with severe pulmonary hypertension. Mild pulmonic regurgitation present. 5- 55% EF, LVH, LAE, CHELLY, severe MS, Mod TR SPAP 88mmHg. Surgery vs. medical therapy discussed and patient opted to continue medical therapy. Patient went to the ED for epistaxis 1/2021. She has a history of nasal polyps. She follows with an ENT.  on Creatinine 1.6, BUN 44, hemoglobin 7.6. Platelets 62, WBC 40.8 10/19/2021. She reports she has been having intermittent BLE edema. She reports she feels well otherwise. She reports she was told she needed to see her hematologist due to abnormal CBC.  She reports she is taking all medications as prescribed. She is not on a blood thinner due to bleeding issues in 2017. Patient denies current edema, chest pain, sob, palpitations, dizziness or syncope. Past medical History:   Lizzie Orellana is a 79 y.o. female who has a past medical history of atrial fibrillation, rheumatic mitral stenosis, hypertension, and a murmur. She was admitted to the hospital in 2017 with abdominal pain. A CT of the abdomin showed large rectus sheath hematoma with signs of active hemorrhage. She had acute anemia and required I unit of PRBC's. Her INR was 3.6 so she received 6 units of FFP. She was treated for fluid overload with IV Lasix. Most recent echocardiogram from 4/10/19 at [facility] showed an EF 50-55% Severe mitral stenosis moderate to severe pulmonary HTN, mild aortic stenosis and regurgitation. Past Medical History:   has a past medical history of A-fib ([address] Arthritis, Bronchitis, Heart murmur, Hypertension, and Pneumonia. Surgical History:   has a past surgical history that includes Hysterectomy and Appendectomy. Social History:   reports that she has never smoked. She has never used smokeless tobacco. She reports that she does not drink alcohol and does not use drugs. Family History:  family history is not on file. Father had CHF and  at 66, was an alcoholic. mother had dementia and was also an alcoholic. She dies in her 80;s. Home Medications:  Prior to Admission medications    Medication Sig Start Date End Date Taking?  Authorizing Provider   metoprolol tartrate (LOPRESSOR) 25 MG tablet Take 1 tablet by mouth 2 times daily 10/26/21  Yes Ariadna Adorno MD   losartan-hydroCHLOROthiazide (HYZAAR) 100-25 MG per tablet Take 1 tablet by mouth daily   Yes Historical Provider, MD   furosemide (LASIX) 40 MG tablet Take 1 tablet by mouth daily 20  Yes Ariadna Adorno MD   potassium chloride (KLOR-CON M) 20 MEQ TBCR extended release tablet Take 1 tablet by mouth daily 12/18/20  Yes Ian Gross MD   Cholecalciferol (VITAMIN D3) 50 MCG (2000 UT) CAPS take one tablet daily 6/10/20  Yes Historical Provider, MD   ferrous sulfate (IRON 325) 325 (65 Fe) MG tablet Take 325 mg by mouth daily (with breakfast)   Yes Historical Provider, MD   verapamil (CALAN-SR) 240 MG CR tablet Take 240 mg by mouth nightly. Yes Historical Provider, MD        Allergies:  Mucinex [guaifenesin er], Nsaids, and Prevnar 13 [pneumococcal 13-alisa conj vacc]     Review of Systems:   12 point ROS negative in all areas as listed below except as in [place]  Constitutional, EENT, pulmonary, GI, , Musculoskeletal, skin, neurological, hematological, endocrine, Psychiatric    Physical Examination:    Vitals:    10/26/21 1025   BP: (!) 122/48   Pulse:    SpO2:        This SmartLink has not been configured with any valid records. Wt Readings from Last 3 Encounters:   10/26/21 140 lb (63.5 kg)   01/20/21 130 lb (59 kg)   01/17/21 138 lb (62.6 kg)       Constitutional and General Appearance: NAD   Respiratory:  · Normal excursion and expansion without use of accessory muscles  · Resp Auscultation: Normal breath sounds without dullness  Cardiovascular:  · The apical impulses not displaced  · Heart tones are crisp and Irreg irreg  · Cervical veins are not engorged  · The carotid upstroke is normal in amplitude and contour without delay or bruit  · S1 variable intensity S2 normal, No S3, mitral stenosis and  tricuspid regurg   Murmur  · Peripheral pulses are symmetrical and full  · There is no clubbing, cyanosis of the extremities. · 2+BLE edema, multiple varicose veins bilateral legs.   · Femoral Arteries: 2+ and equal  · Pedal Pulses: 2+ and equal   Abdomen:  · No masses or tenderness  · Liver/Spleen: No Abnormalities Noted  Neurological/Psychiatric:  · Alert and oriented in all spheres  · Moves all extremities well  · Exhibits normal gait balance and coordination  · No abnormalities of mood, affect, memory, mentation, or behavior are noted  Skin:  · Skin: warm and dry. Imaging:  I have reviewed the following tests and documented in this encounter as follows:   Discussed with the patient and daughter who accompanied her. EKG 8/18/20  AFIB occasional PVC    ECHO 5/18/20 performed in [place]   EF 55-60% mild AR, MR, severe pulm HTN,     CXR  May 2020  pulmonary edema    ECHO 4//2019 performed at [facility]  EF 50-55% Severe mitral stenosis moderate to severe pulmonary HTN,  mild AS,  AR    ECHO 05/20/17  Summary   LV systolic function appears normal.   Ejection fraction is visually estimated to be 55%. No regional wall motion abnormalities are noted. Mild concentric left ventricular hypertrophy. Evidence for diastolic dysfunction. Very severe biatrial dilatation. There is decreased leaflet motion and \"hockey stick\" appearance of thickened   mitral leaflets. Severe mitral stenosis (P1/2 time=253ms with MVA=0.87cm2   and mean pressure gradient=22mmHg). Mild to moderate eccentric mitral   regurgitation. Moderate tricuspid regurgitation. Mild RV systolic dysfunction. The systolic pulmonary artery pressure (SPAP) estimated at 88 mmHg   (estimated RA pressure of 15 mmHg included) c/w severe pulmonary HTN. EKG 05/20/17  Atrial fibrillation   Baseline artifact   Rightward axis   ST & T wave abnormality, consider inferolateral ischemia or digitalis effect   Abnormal ECG   No previous ECGs available  Confirmed by SUSAN CALVO MD ([id number]) on 5/20/2017 7:48:04 AM     CT of the abdomin and Pelvis   1. Large rectus sheath hematoma on the left, which contains blood products of mixed chronicity, including several foci of active hemorrhage. A small amount of blood extends from the hematoma into the anterior pelvis. 2. Marked cardiomegaly with interstitial edema. 3. Uncomplicated cholelithiasis. Results of this examination were verbally communicated to Dr. Alison Fletcher at 5:16 a.m. on 05/20/2017. Assessment:   1. Atrial fibrillation rate is controlled it is permanent  2. Hypertension controlled with losartan HCTZ  3. Rheumatic mitral stenosis with insufficiency   4. CHF  5. History of rectal sheath hemorrhage   6. History of nose bleeds   7. SOB due to CHF  8. Ankle swelling will increase lasix      1. Permanent atrial fibrillation ([address]    2. Primary hypertension    3. Rheumatic mitral stenosis    4. Anemia with low platelet count (HCC)    5. Thrombocytopenia, unspecified ([address]    6. Tachycardia    7. Pancytopenia (HCC)        [id number] Score for Atrial Fibrillation Stroke Risk   Risk   Factors  Component Value   C CHF No 0   H HTN Yes 1   A2 Age >= 76 Yes,  (79 y.o.) 2   D DM No 0   S2 Prior Stroke/TIA No 0   V Vascular Disease No 0   A Age 74-69 No,  (79 y.o.) 0   Sc Sex female 1    [id number]  Score  4   Score last updated 8/18/20 1:68 PM EDT    Click here for a link to the UpToDate guideline \"Atrial Fibrillation: Anticoagulation therapy to prevent embolization    Disclaimer: Risk Score calculation is dependent on accuracy of patient problem list and past encounter diagnosis. Plan:  1. Referral to Dr Link Donovan for hematology/oncology for thrombocytopenia, lymphocytosis anemia rule of bone marrow disorder. .   2. Start metoprolol 12.5mg twice daily. After one week, increase to 25mg twice daily for better rate control. 3. Recommend compression stockings (knee high) for swelling. Limit salt to 2g per day. Limit 64 ounces of fluid per day. Elevate your legs to help with swelling. 4. Follow up with me in 3 months. QUALITY MEASURES  1. Tobacco Cessation Counseling: NA  2. Retake of BP if >140/90:   Yes  3. Documentation to PCP/referring for new patient:  Sent to PCP at close of office visit  4. CAD patient on anti-platelet: NA  5. CAD patient on STATIN therapy:  NA  6.  Patient with CHF and aFib on anticoagulation: Unable to take due to bleeding     This note has been scribed in the presence of Fabiola Poon MD, by Susan Hannah RN.     I, Dr. Dedra Moyer, personally performed the services described in this documentation, as scribed by the above signed scribe in my presence. It is both accurate and complete to my knowledge. I agree with the details independently gathered by the clinical support staff, while the remaining scribed note accurately describes my personal service to the patient.         Jessica Denson MD

## 2021-10-26 NOTE — LETTER
[facility]   Cardiac follow up     Referring Provider:  Yaneli Perez     Chief Complaint   Patient presents with    1 Year Follow Up    Atrial Fibrillation    Fatigue    Edema     bilateral feet, legs    Shortness of Breath    Hypertension   Severe  Mitral stenosis   Severe tricuspid regurgitation  Permanent afib  Subjective:    Angelica Painting is a 79 y.o.  female who is here today for follow up for a history of atrial fibrillation, rheumatic mitral stenosis, hypertension and pulmonary hypertension. History of Present Illness: Patient was instructed to increase lasix to 40mg daily at her last visit   12/2020. Echo 12/2020 demonstrated 55-60%. Elevated left ventricular diastolic filling pressure. The left atrium is severely dilated. The right atrium is severely dilated. Moderate posterior mitral annular calcification is present. There is decreased leaflet motion and \"hockey stick\" appearance of the anterior mitral leaflet c/w mitral stenosis. Mitral valve area is calculated at 0.6cm2 consistent with severe mitral stenosis. Mild mitral regurgitation. Aortic valve sclerosis without aortic stenosis. Mild aortic regurgitation. Severe tricuspid regurgitation. Systolic pulmonary artery pressure (SPAP) estimated at 82 mmHg (RA pressure 15 mmHg), consistent with severe pulmonary hypertension. Mild pulmonic regurgitation present. 5- 55% EF, LVH, LAE, CHELLY, severe MS, Mod TR SPAP 88mmHg. Surgery vs. medical therapy discussed and patient opted to continue medical therapy. Patient went to the ED for epistaxis 1/2021. She has a history of nasal polyps. She follows with an ENT.  on Creatinine 1.6, BUN 44, hemoglobin 7.6. Platelets 62, WBC 06.6 10/19/2021. She reports she has been having intermittent BLE edema. She reports she feels well otherwise. She reports she was told she needed to see her hematologist due to abnormal CBC.  She reports she is taking all medications as daily 12/18/20  Yes Ashly Wolf MD   Cholecalciferol (VITAMIN D3) 50 MCG (2000 UT) CAPS take one tablet daily 6/10/20  Yes Historical Provider, MD   ferrous sulfate (IRON 325) 325 (65 Fe) MG tablet Take 325 mg by mouth daily (with breakfast)   Yes Historical Provider, MD   verapamil (CALAN-SR) 240 MG CR tablet Take 240 mg by mouth nightly. Yes Historical Provider, MD        Allergies:  Mucinex [guaifenesin er], Nsaids, and Prevnar 13 [pneumococcal 13-alisa conj vacc]     Review of Systems:   12 point ROS negative in all areas as listed below except as in [place]  Constitutional, EENT, pulmonary, GI, , Musculoskeletal, skin, neurological, hematological, endocrine, Psychiatric    Physical Examination:    Vitals:    10/26/21 1025   BP: (!) 122/48   Pulse:    SpO2:        This SmartLink has not been configured with any valid records. Wt Readings from Last 3 Encounters:   10/26/21 140 lb (63.5 kg)   01/20/21 130 lb (59 kg)   01/17/21 138 lb (62.6 kg)       Constitutional and General Appearance: NAD   Respiratory:  · Normal excursion and expansion without use of accessory muscles  · Resp Auscultation: Normal breath sounds without dullness  Cardiovascular:  · The apical impulses not displaced  · Heart tones are crisp and Irreg irreg  · Cervical veins are not engorged  · The carotid upstroke is normal in amplitude and contour without delay or bruit  · S1 variable intensity S2 normal, No S3, mitral stenosis and  tricuspid regurg   Murmur  · Peripheral pulses are symmetrical and full  · There is no clubbing, cyanosis of the extremities. · 2+BLE edema, multiple varicose veins bilateral legs.   · Femoral Arteries: 2+ and equal  · Pedal Pulses: 2+ and equal   Abdomen:  · No masses or tenderness  · Liver/Spleen: No Abnormalities Noted  Neurological/Psychiatric:  · Alert and oriented in all spheres  · Moves all extremities well  · Exhibits normal gait balance and coordination  · No abnormalities of mood, affect, memory, mentation, or behavior are noted  Skin:  · Skin: warm and dry. Imaging:  I have reviewed the following tests and documented in this encounter as follows:   Discussed with the patient and daughter who accompanied her. EKG 8/18/20  AFIB occasional PVC    ECHO 5/18/20 performed in [place]   EF 55-60% mild AR, MR, severe pulm HTN,     CXR  May 2020  pulmonary edema    ECHO 4//2019 performed at [facility]  EF 50-55% Severe mitral stenosis moderate to severe pulmonary HTN,  mild AS,  AR    ECHO 05/20/17  Summary   LV systolic function appears normal.   Ejection fraction is visually estimated to be 55%. No regional wall motion abnormalities are noted. Mild concentric left ventricular hypertrophy. Evidence for diastolic dysfunction. Very severe biatrial dilatation. There is decreased leaflet motion and \"hockey stick\" appearance of thickened   mitral leaflets. Severe mitral stenosis (P1/2 time=253ms with MVA=0.87cm2   and mean pressure gradient=22mmHg). Mild to moderate eccentric mitral   regurgitation. Moderate tricuspid regurgitation. Mild RV systolic dysfunction. The systolic pulmonary artery pressure (SPAP) estimated at 88 mmHg   (estimated RA pressure of 15 mmHg included) c/w severe pulmonary HTN. EKG 05/20/17  Atrial fibrillation   Baseline artifact   Rightward axis   ST & T wave abnormality, consider inferolateral ischemia or digitalis effect   Abnormal ECG   No previous ECGs available  Confirmed by SUSAN CALVO MD ([id number]) on 5/20/2017 7:48:04 AM     CT of the abdomin and Pelvis   1. Large rectus sheath hematoma on the left, which contains blood products of mixed chronicity, including several foci of active hemorrhage. A small amount of blood extends from the hematoma into the anterior pelvis. 2. Marked cardiomegaly with interstitial edema. 3. Uncomplicated cholelithiasis. Results of this examination were verbally communicated to Dr. Ysabel Downing at 5:16 a.m. on 05/20/2017. Assessment:   1. Atrial fibrillation rate is controlled it is permanent  2. Hypertension controlled with losartan HCTZ  3. Rheumatic mitral stenosis with insufficiency   4. CHF  5. History of rectal sheath hemorrhage   6. History of nose bleeds   7. SOB due to CHF  8. Ankle swelling will increase lasix      1. Permanent atrial fibrillation ([address]    2. Primary hypertension    3. Rheumatic mitral stenosis    4. Anemia with low platelet count (HCC)    5. Thrombocytopenia, unspecified ([address]    6. Tachycardia    7. Pancytopenia (HCC)        [id number] Score for Atrial Fibrillation Stroke Risk   Risk   Factors  Component Value   C CHF No 0   H HTN Yes 1   A2 Age >= 76 Yes,  (79 y.o.) 2   D DM No 0   S2 Prior Stroke/TIA No 0   V Vascular Disease No 0   A Age 74-69 No,  (79 y.o.) 0   Sc Sex female 1    [id number]  Score  4   Score last updated 8/18/20 4:61 PM EDT    Click here for a link to the UpToDate guideline \"Atrial Fibrillation: Anticoagulation therapy to prevent embolization    Disclaimer: Risk Score calculation is dependent on accuracy of patient problem list and past encounter diagnosis. Plan:  1. Referral to Dr Ryder Hinton for hematology/oncology for thrombocytopenia, lymphocytosis anemia rule of bone marrow disorder. .   2. Start metoprolol 12.5mg twice daily. After one week, increase to 25mg twice daily for better rate control. 3. Recommend compression stockings (knee high) for swelling. Limit salt to 2g per day. Limit 64 ounces of fluid per day. Elevate your legs to help with swelling. 4. Follow up with me in 3 months. QUALITY MEASURES  1. Tobacco Cessation Counseling: NA  2. Retake of BP if >140/90:   Yes  3. Documentation to PCP/referring for new patient:  Sent to PCP at close of office visit  4. CAD patient on anti-platelet: NA  5. CAD patient on STATIN therapy:  NA  6.  Patient with CHF and aFib on anticoagulation: Unable to take due to bleeding     This note has been scribed in the presence of Bella Hanson MD, by Clovia Litten, RN.     I, Dr. Meron Alexis, personally performed the services described in this documentation, as scribed by the above signed scribe in my presence. It is both accurate and complete to my knowledge. I agree with the details independently gathered by the clinical support staff, while the remaining scribed note accurately describes my personal service to the patient.         Kelly Akbar MD